# Patient Record
Sex: FEMALE | Race: WHITE | URBAN - METROPOLITAN AREA
[De-identification: names, ages, dates, MRNs, and addresses within clinical notes are randomized per-mention and may not be internally consistent; named-entity substitution may affect disease eponyms.]

---

## 2017-12-18 ENCOUNTER — OFFICE VISIT (OUTPATIENT)
Dept: FAMILY MEDICINE | Facility: CLINIC | Age: 42
End: 2017-12-18
Payer: COMMERCIAL

## 2017-12-18 VITALS
TEMPERATURE: 97.9 F | DIASTOLIC BLOOD PRESSURE: 66 MMHG | SYSTOLIC BLOOD PRESSURE: 118 MMHG | WEIGHT: 143 LBS | HEIGHT: 65 IN | HEART RATE: 72 BPM | BODY MASS INDEX: 23.82 KG/M2

## 2017-12-18 DIAGNOSIS — Z00.00 ROUTINE GENERAL MEDICAL EXAMINATION AT A HEALTH CARE FACILITY: Primary | ICD-10-CM

## 2017-12-18 DIAGNOSIS — Z23 NEED FOR PROPHYLACTIC VACCINATION AND INOCULATION AGAINST INFLUENZA: ICD-10-CM

## 2017-12-18 DIAGNOSIS — Z12.31 VISIT FOR SCREENING MAMMOGRAM: ICD-10-CM

## 2017-12-18 PROCEDURE — 99386 PREV VISIT NEW AGE 40-64: CPT | Mod: 25 | Performed by: NURSE PRACTITIONER

## 2017-12-18 PROCEDURE — 90471 IMMUNIZATION ADMIN: CPT | Performed by: NURSE PRACTITIONER

## 2017-12-18 PROCEDURE — 90686 IIV4 VACC NO PRSV 0.5 ML IM: CPT | Performed by: NURSE PRACTITIONER

## 2017-12-18 ASSESSMENT — ENCOUNTER SYMPTOMS
RHINORRHEA: 0
ACTIVITY CHANGE: 0
LIGHT-HEADEDNESS: 0
DIFFICULTY URINATING: 0
COUGH: 0
DYSPHORIC MOOD: 0
WHEEZING: 0
SLEEP DISTURBANCE: 0
SHORTNESS OF BREATH: 0
CHEST TIGHTNESS: 0
ABDOMINAL PAIN: 0
NAUSEA: 0
FATIGUE: 0
PALPITATIONS: 0
HEADACHES: 0
ARTHRALGIAS: 0
CONSTIPATION: 0
SORE THROAT: 0
FREQUENCY: 0
NERVOUS/ANXIOUS: 0
NUMBNESS: 0
POLYDIPSIA: 0
ABDOMINAL DISTENTION: 0
ROS SKIN COMMENTS: SELF BREAST EXAM WITHOUT AREA OF CONCERN.
DIZZINESS: 0
VOMITING: 0
POLYPHAGIA: 0
DIARRHEA: 0

## 2017-12-18 NOTE — MR AVS SNAPSHOT
After Visit Summary   12/18/2017    Brigitte Bassett    MRN: [id number]           Patient Information     Date Of Birth          1975        Visit Information        Provider Department      12/18/2017 3:00 PM Brii Rosas APRN [facility]        Today's Diagnoses     Routine general medical examination at a health care facility    -  1    Visit for screening mammogram        Vaginal discharge        Need for prophylactic vaccination and inoculation against influenza          Care Instructions    Make a return lab appointment for fasting labs when you have not had anything to eat for 8 hours prior and the only thing to drink is water.       Preventive Health Recommendations  Female Ages 40 to 49    Yearly exam:     See your health care provider every year in order to  1. Review health changes.   2. Discuss preventive care.    3. Review your medicines if your doctor prescribed any.      Get a Pap test every three years (unless you have an abnormal result and your provider advises testing more often).      If you get Pap tests with HPV test, you only need to test every 5 years, unless you have an abnormal result. You do not need a Pap test if your uterus was removed (hysterectomy) and you have not had cancer.      You should be tested each year for STDs (sexually transmitted diseases), if you're at risk.       Ask your doctor if you should have a mammogram.      Have a colonoscopy (test for colon cancer) if someone in your family has had colon cancer or polyps before age 50.       Have a cholesterol test every 5 years.       Have a diabetes test (fasting glucose) after age 45. If you are at risk for diabetes, you should have this test every 3 years.    Shots: Get a flu shot each year. Get a tetanus shot every 10 years.     Nutrition:     Eat at least 5 servings of fruits and vegetables each day.    Eat whole-grain bread, whole-wheat pasta and brown rice instead of white  "grains and rice.    Talk to your provider about Calcium and Vitamin D.     Lifestyle    Exercise at least 150 minutes a week (an average of 30 minutes a day, 5 days a week). This will help you control your weight and prevent disease.    Limit alcohol to one drink per day.    No smoking.     Wear sunscreen to prevent skin cancer.    See your dentist every six months for an exam and cleaning.          Follow-ups after your visit        Future tests that were ordered for you today     Open Future Orders        Priority Expected Expires Ordered    MA Screening Digital Bilateral Routine  12/18/2018 12/18/2017    CBC with platelets differential Routine  12/18/2018 12/18/2017    Comprehensive metabolic panel Routine  12/18/2018 12/18/2017    TSH with free T4 reflex Routine  12/18/2018 12/18/2017    Lipid panel reflex to direct LDL Fasting Routine  12/18/2018 12/18/2017            Who to contact     Normal or non-critical lab and imaging results will be communicated to you by [employer] Media[employer], letter or phone within 4 business days after the clinic has received the results. If you do not hear from us within 7 days, please contact the clinic through [employer]endot or phone. If you have a critical or abnormal lab result, we will notify you by phone as soon as possible.  Submit refill requests through Sellvana or call your pharmacy and they will forward the refill request to us. Please allow 3 business days for your refill to be completed.          If you need to speak with a  for additional information , please call: [phone]           Additional Information About Your Visit        Sellvana Information     Sellvana lets you send messages to your doctor, view your test results, renew your prescriptions, schedule appointments and more. To sign up, go to [email or web address] . Click on \"Log in\" on the left side of the screen, which will take you to the Welcome page. Then click on \"Sign up Now\" on the right side of the " "page.     You will be asked to enter the access code listed below, as well as some personal information. Please follow the directions to create your username and password.     Your access code is: [email or web address]  Expires: 3/18/2018  3:34 PM     Your access code will  in 90 days. If you need help or a new code, please call your [facility] clinic or [phone].        Care EveryWhere ID     This is your Care EveryWhere ID. This could be used by other organizations to access your [facility] medical records  [id number]        Your Vitals Were     Pulse Temperature Height Last Period BMI (Body Mass Index)       72 97.9  F (36.6  C) (Tympanic) 5' 5.25\" (1.657 m) 2017 23.61 kg/m2        Blood Pressure from Last 3 Encounters:   17 118/66    Weight from Last 3 Encounters:   17 143 lb (64.9 kg)              We Performed the Following     FLU VAC, SPLIT VIRUS IM > 3 YO (QUADRIVALENT) [81052]     Vaccine Administration, Initial [75161]        Primary Care Provider Office Phone # Fax #    Brii Mery Rosas, APRN [phone]       [facility] [address] [place] [address]        Equal Access to Services     MORRIS RODAS AH: Hadii aad ku hadasho Soomaali, waaxda luqadaha, qaybta kaalmada adeegyada, waxumesh idiin hayaan maninder [place] . So [facility] [phone].    ATENCIÓN: Si habla español, tiene a charles disposición servicios gratuitos de asistencia lingüística. Llame al [phone].    We comply with applicable federal civil rights laws and [place] laws. We do not discriminate on the basis of race, color, national origin, age, disability, sex, sexual orientation, or gender identity.            Thank you!     Thank you for choosing [facility]  for your care. Our goal is always to provide you with excellent care. Hearing back from our patients is one way we can continue to improve our services. Please take a few minutes to complete the written survey " that you may receive in the mail after your visit with us. Thank you!             Your Updated Medication List - Protect others around you: Learn how to safely use, store and throw away your medicines at www.disposemymeds.org.      Notice  As of 12/18/2017  3:34 PM    You have not been prescribed any medications.

## 2017-12-18 NOTE — PATIENT INSTRUCTIONS
Make a return lab appointment for fasting labs when you have not had anything to eat for 8 hours prior and the only thing to drink is water.       Preventive Health Recommendations  Female Ages 40 to 49    Yearly exam:     See your health care provider every year in order to  1. Review health changes.   2. Discuss preventive care.    3. Review your medicines if your doctor prescribed any.      Get a Pap test every three years (unless you have an abnormal result and your provider advises testing more often).      If you get Pap tests with HPV test, you only need to test every 5 years, unless you have an abnormal result. You do not need a Pap test if your uterus was removed (hysterectomy) and you have not had cancer.      You should be tested each year for STDs (sexually transmitted diseases), if you're at risk.       Ask your doctor if you should have a mammogram.      Have a colonoscopy (test for colon cancer) if someone in your family has had colon cancer or polyps before age 50.       Have a cholesterol test every 5 years.       Have a diabetes test (fasting glucose) after age 45. If you are at risk for diabetes, you should have this test every 3 years.    Shots: Get a flu shot each year. Get a tetanus shot every 10 years.     Nutrition:     Eat at least 5 servings of fruits and vegetables each day.    Eat whole-grain bread, whole-wheat pasta and brown rice instead of white grains and rice.    Talk to your provider about Calcium and Vitamin D.     Lifestyle    Exercise at least 150 minutes a week (an average of 30 minutes a day, 5 days a week). This will help you control your weight and prevent disease.    Limit alcohol to one drink per day.    No smoking.     Wear sunscreen to prevent skin cancer.    See your dentist every six months for an exam and cleaning.
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
## 2017-12-18 NOTE — NURSING NOTE
"Chief Complaint   Patient presents with     Physical       Initial /66 (BP Location: Left arm, Patient Position: Sitting, Cuff Size: Adult Regular)  Pulse 72  Temp 97.9  F (36.6  C) (Tympanic)  Ht 5' 5.25\" (1.657 m)  Wt 143 lb (64.9 kg)  LMP 11/27/2017  BMI 23.61 kg/m2 Estimated body mass index is 23.61 kg/(m^2) as calculated from the following:    Height as of this encounter: 5' 5.25\" (1.657 m).    Weight as of this encounter: 143 lb (64.9 kg).  Medication Reconciliation: complete     April SAMMY Mahmood      "

## 2018-01-03 ENCOUNTER — TELEPHONE (OUTPATIENT)
Dept: FAMILY MEDICINE | Facility: CLINIC | Age: 43
End: 2018-01-03

## 2018-01-03 NOTE — TELEPHONE ENCOUNTER
Please call Brigitte and let her know that we got her old records.  Her last Pap smear was in 2015 and it was normal.  She will need a repeat Pap smear and HPV testing in  August 2018.    Brii PALACIO

## 2018-01-05 ENCOUNTER — HOSPITAL ENCOUNTER (OUTPATIENT)
Dept: MAMMOGRAPHY | Facility: CLINIC | Age: 43
Discharge: HOME | End: 2018-01-05
Attending: NURSE PRACTITIONER | Admitting: NURSE PRACTITIONER
Payer: COMMERCIAL

## 2018-01-05 DIAGNOSIS — Z00.00 ROUTINE GENERAL MEDICAL EXAMINATION AT A HEALTH CARE FACILITY: ICD-10-CM

## 2018-01-05 DIAGNOSIS — Z12.31 VISIT FOR SCREENING MAMMOGRAM: ICD-10-CM

## 2018-01-05 LAB
ALBUMIN SERPL-MCNC: 3.6 G/DL (ref 3.4–5)
ALP SERPL-CCNC: 54 U/L (ref 40–150)
ALT SERPL W P-5'-P-CCNC: 14 U/L (ref 0–50)
ANION GAP SERPL CALCULATED.3IONS-SCNC: 6 MMOL/L (ref 3–14)
AST SERPL W P-5'-P-CCNC: 11 U/L (ref 0–45)
BASOPHILS # BLD AUTO: 0 10E9/L (ref 0–0.2)
BASOPHILS NFR BLD AUTO: 0.3 %
BILIRUB SERPL-MCNC: 0.8 MG/DL (ref 0.2–1.3)
BUN SERPL-MCNC: 9 MG/DL (ref 7–30)
CALCIUM SERPL-MCNC: 8.6 MG/DL (ref 8.5–10.1)
CHLORIDE SERPL-SCNC: 107 MMOL/L (ref 94–109)
CHOLEST SERPL-MCNC: 175 MG/DL
CO2 SERPL-SCNC: 26 MMOL/L (ref 20–32)
CREAT SERPL-MCNC: 0.55 MG/DL (ref 0.52–1.04)
DIFFERENTIAL METHOD BLD: NORMAL
EOSINOPHIL # BLD AUTO: 0 10E9/L (ref 0–0.7)
EOSINOPHIL NFR BLD AUTO: 0.6 %
ERYTHROCYTE [DISTWIDTH] IN BLOOD BY AUTOMATED COUNT: 12.9 % (ref 10–15)
GFR SERPL CREATININE-BSD FRML MDRD: >90 ML/MIN/1.7M2
GLUCOSE SERPL-MCNC: 83 MG/DL (ref 70–99)
HCT VFR BLD AUTO: 37.3 % (ref 35–47)
HDLC SERPL-MCNC: 81 MG/DL
HGB BLD-MCNC: 12.7 G/DL (ref 11.7–15.7)
LDLC SERPL CALC-MCNC: 82 MG/DL
LYMPHOCYTES # BLD AUTO: 2.1 10E9/L (ref 0.8–5.3)
LYMPHOCYTES NFR BLD AUTO: 31.7 %
MCH RBC QN AUTO: 33 PG (ref 26.5–33)
MCHC RBC AUTO-ENTMCNC: 34 G/DL (ref 31.5–36.5)
MCV RBC AUTO: 97 FL (ref 78–100)
MONOCYTES # BLD AUTO: 0.6 10E9/L (ref 0–1.3)
MONOCYTES NFR BLD AUTO: 8.5 %
NEUTROPHILS # BLD AUTO: 3.8 10E9/L (ref 1.6–8.3)
NEUTROPHILS NFR BLD AUTO: 58.9 %
NONHDLC SERPL-MCNC: 94 MG/DL
PLATELET # BLD AUTO: 270 10E9/L (ref 150–450)
POTASSIUM SERPL-SCNC: 3.7 MMOL/L (ref 3.4–5.3)
PROT SERPL-MCNC: 6.7 G/DL (ref 6.8–8.8)
RBC # BLD AUTO: 3.85 10E12/L (ref 3.8–5.2)
SODIUM SERPL-SCNC: 139 MMOL/L (ref 133–144)
TRIGL SERPL-MCNC: 62 MG/DL
TSH SERPL DL<=0.005 MIU/L-ACNC: 0.93 MU/L (ref 0.4–4)
WBC # BLD AUTO: 6.5 10E9/L (ref 4–11)

## 2018-01-05 PROCEDURE — 77063 BREAST TOMOSYNTHESIS BI: CPT

## 2018-01-05 PROCEDURE — 80061 LIPID PANEL: CPT | Performed by: NURSE PRACTITIONER

## 2018-01-05 PROCEDURE — 36415 COLL VENOUS BLD VENIPUNCTURE: CPT | Performed by: NURSE PRACTITIONER

## 2018-01-05 PROCEDURE — 80050 GENERAL HEALTH PANEL: CPT | Performed by: NURSE PRACTITIONER

## 2018-01-18 ENCOUNTER — HOSPITAL ENCOUNTER (OUTPATIENT)
Dept: MAMMOGRAPHY | Facility: CLINIC | Age: 43
Discharge: HOME | End: 2018-01-18
Attending: NURSE PRACTITIONER | Admitting: NURSE PRACTITIONER
Payer: COMMERCIAL

## 2018-01-18 DIAGNOSIS — R92.8 ABNORMAL MAMMOGRAM: ICD-10-CM

## 2018-01-18 PROCEDURE — 77065 DX MAMMO INCL CAD UNI: CPT | Mod: LT

## 2018-01-31 RX ORDER — LIDOCAINE HYDROCHLORIDE 10 MG/ML
15 INJECTION, SOLUTION EPIDURAL; INFILTRATION; INTRACAUDAL; PERINEURAL ONCE
Status: COMPLETED | OUTPATIENT
Start: 2018-02-01 | End: 2018-02-01

## 2018-01-31 NOTE — DISCHARGE INSTRUCTIONS
Breast Biopsy Care Instructions      Site Care:    You may have some discomfort in the breast and may see some bruising at the needle site.    You will be given an ice pack which should be kept in place over the dressing until bedtime tonight.Always keep a gauze pad between your skin and the ice pack.    Wearing your bra continuously for 24 hours post biopsy will give optimal support to the biopsy site.    Activity:       You may go back to your normal routine.     No heavy lifting for 24 hours.    Diet and Medications:       You may go back to your regular diet.     Tylenol is the best choice to relieve your discomfort, the first 24 hours. If you have no bleeding on the first day, Ibuprofen (such as Advil, or Motrin), may be taken on the second day after for pain.     Do not take aspirin for 72 hours following your biopsy.    Questions:     If you have any questions about your procedure performed in Ultrasound, you may contact us at [phone].If you have any questions about your procedure performed in Mammography, you may contact us at [phone].    Results:       The pathology report on your biopsy is usually available within 72 hours. The Radiologist or your personal physician will call you with the results.     You will receive information about any further follow up from your physician.    Call your doctor if you have:       More than slight bleeding or significant pain, or experience swelling of the breast.     If your physician is unavailable, please call the Nurse Advice Line at [phone], or [facility] Emergency Department at [phone].      Patient:______________________________  Time:_________  Date:_____________    Instructor:____________________________   Time:_________  Date:_____________

## 2018-02-01 ENCOUNTER — HOSPITAL ENCOUNTER (OUTPATIENT)
Dept: MAMMOGRAPHY | Facility: CLINIC | Age: 43
End: 2018-02-01
Attending: NURSE PRACTITIONER
Payer: COMMERCIAL

## 2018-02-01 ENCOUNTER — HOSPITAL ENCOUNTER (OUTPATIENT)
Dept: MAMMOGRAPHY | Facility: CLINIC | Age: 43
Discharge: HOME | End: 2018-02-01
Attending: NURSE PRACTITIONER | Admitting: NURSE PRACTITIONER
Payer: COMMERCIAL

## 2018-02-01 DIAGNOSIS — R92.1 BREAST CALCIFICATION, LEFT: ICD-10-CM

## 2018-02-01 DIAGNOSIS — R92.8 ABNORMAL MAMMOGRAM: ICD-10-CM

## 2018-02-01 PROCEDURE — 27210207 MA STEREOTACTIC BREAST BIOPSY VACUUM ASST LEFT

## 2018-02-01 PROCEDURE — 40000986 MA POST PROCEDURE LEFT

## 2018-02-01 PROCEDURE — 25000125 ZZHC RX 250: Performed by: NURSE PRACTITIONER

## 2018-02-01 PROCEDURE — 88305 TISSUE EXAM BY PATHOLOGIST: CPT | Mod: 26 | Performed by: RADIOLOGY

## 2018-02-01 PROCEDURE — 88305 TISSUE EXAM BY PATHOLOGIST: CPT | Performed by: RADIOLOGY

## 2018-02-01 RX ADMIN — LIDOCAINE HYDROCHLORIDE 150 MG: 10 INJECTION, SOLUTION EPIDURAL; INFILTRATION; INTRACAUDAL; PERINEURAL at 13:27

## 2018-02-01 NOTE — IP AVS SNAPSHOT
MRN:[id number]                      After Visit Summary   2/1/2018    Brigitte Bassett    MRN: [id number]           Visit Information        Provider Department      2/1/2018  1:00 PM [facility]; [facility]           Review of your medicines      Notice     Cannot display patient medications because the patient has not yet been checked in.             Protect others around you: Learn how to safely use, store and throw away your medicines at www.disposemymeds.org.         Follow-ups after your visit        Your next 10 appointments already scheduled     Feb 01, 2018  1:00 PM CST   (Arrive by 12:45 PM)   MA STEREOTACTIC BREAST BIOPSY VACUUM ASST LEFT with CLARISSA1, [place] [facility]   [facility] ([facility])    [address] [id number] [phone]           Do not use any powder, lotion or deodorant under your arms or on your breast. If you do, we will ask you to remove it before your exam.  Wear comfortable clothing and a well-fitted bra to the clinic. (A sports bra is best.) Bring a list of your medicines, including vitamins, minerals and over-the-counter drugs. It is best to leave valuables at home.  Tell your care team if:   You have any allergies.   You are taking aspirin, ibuprofen, naproxen or any drug that could increase bleeding.  Bring any previous mammograms from other facilities or have them mailed to the breast center.    Stop taking Coumadin (warfarin) 5 days before treatment. Restart the day after treatment.   If you take Plavix, Ticlid, Pletal or Persantine, ask your doctor if you should stop these medicines. You may need extra tests on the morning of your scan            Feb 01, 2018  2:30 PM CST   MA POST PROCEDURE LEFT with WYMA1   [facility] ([facility])    [address] [id number] [phone]           Do not use any powder, lotion or deodorant under your arms or on your  breast. If you do, we will ask you to remove it before your exam.  Wear comfortable, two-piece clothing.  If you have any allergies, tell your care team.  Bring any previous mammograms from other facilities or have them mailed to the breast center.               Care Instructions        Further instructions from your care team       Breast Biopsy Care Instructions      Site Care:    You may have some discomfort in the breast and may see some bruising at the needle site.    You will be given an ice pack which should be kept in place over the dressing until bedtime tonight.Always keep a gauze pad between your skin and the ice pack.    Wearing your bra continuously for 24 hours post biopsy will give optimal support to the biopsy site.    Activity:       You may go back to your normal routine.     No heavy lifting for 24 hours.    Diet and Medications:       You may go back to your regular diet.     Tylenol is the best choice to relieve your discomfort, the first 24 hours. If you have no bleeding on the first day, Ibuprofen (such as Advil, or Motrin), may be taken on the second day after for pain.     Do not take aspirin for 72 hours following your biopsy.    Questions:     If you have any questions about your procedure performed in Ultrasound, you may contact us at [phone].If you have any questions about your procedure performed in Mammography, you may contact us at [phone].    Results:       The pathology report on your biopsy is usually available within 72 hours. The Radiologist or your personal physician will call you with the results.     You will receive information about any further follow up from your physician.    Call your doctor if you have:       More than slight bleeding or significant pain, or experience swelling of the breast.     If your physician is unavailable, please call the Nurse Advice Line at [phone], or [facility] Emergency Department at  "[phone].      Patient:______________________________  Time:_________  Date:_____________    Instructor:____________________________   Time:_________  Date:_____________      Additional Information About Your Visit        MyChart Information     [email or web address] lets you send messages to your doctor, view your test results, renew your prescriptions, schedule appointments and more. To sign up, go to [email or web address] . Click on \"Log in\" on the left side of the screen, which will take you to the Welcome page. Then click on \"Sign up Now\" on the right side of the page.     You will be asked to enter the access code listed below, as well as some personal information. Please follow the directions to create your username and password.     Your access code is: [email or web address]  Expires: 3/18/2018  3:34 PM     Your access code will  in 90 days. If you need help or a new code, please call your [facility] clinic or [phone].        Care EveryWhere ID     This is your Care EveryWhere ID. This could be used by other organizations to access your [facility] medical records  [id number]         Primary Care Provider Office Phone # Fax #    Brii INGRID Fontaine [phone]      Equal Access to Services     MORRIS RODAS : jelly Kincaid, uriel rodrigez. So [facility] [phone].    ATENCIÓN: Si habla español, tiene a charles disposición servicios gratuitos de asistencia lingüística. Jorge al [phone].    We comply with applicable federal civil rights laws and [place] laws. We do not discriminate on the basis of race, color, national origin, age, disability, sex, sexual orientation, or gender identity.            Thank you!     Thank you for choosing [facility] for your care. Our goal is always to provide you with excellent care. Hearing back from our patients is one way we can continue to improve our services. Please take a " few minutes to complete the written survey that you may receive in the mail after you visit with us. Thank you!             Medication List: This is a list of all your medications and when to take them. Check marks below indicate your daily home schedule. Keep this list as a reference.      Notice     Cannot display patient medications because the patient has not yet been checked in.

## 2018-02-01 NOTE — IP AVS SNAPSHOT
[facility]    [address]    [facility] [address]    Phone:  [phone]    Fax:  [phone]                                       After Visit Summary   2/1/2018    Brigitte Bassett    MRN: [id number]           After Visit Summary Signature Page     I have received my discharge instructions, and my questions have been answered. I have discussed any challenges I see with this plan with the nurse or doctor.    ..........................................................................................................................................  Patient/Patient Representative Signature      ..........................................................................................................................................  Patient Representative Print Name and Relationship to Patient    ..................................................               ................................................  Date                                            Time    ..........................................................................................................................................  Reviewed by Signature/Title    ...................................................              ..............................................  Date                                                            Time

## 2018-02-01 NOTE — PROGRESS NOTES
RADIOLOGY PROCEDURE NOTE  Patient name: Brigitte Bassett  MRN: [id number]  : 1975    Pre-procedure diagnosis: Left breast microcalcifications.  Post-procedure diagnosis: Same    Procedure Date/Time: 2018  1:54 PM  Procedure: Stereotactic needle core biopsy.  Estimated blood loss: None  Specimen(s) collected:  Six vacuum needle cores.  The patient tolerated the procedure well with no immediate complications.    See imaging dictation for procedural details.    Provider name: Siddhartha Medrano  Assistant(s):None

## 2018-02-05 LAB — COPATH REPORT: NORMAL

## 2018-10-31 ENCOUNTER — HEALTH MAINTENANCE LETTER (OUTPATIENT)
Age: 43
End: 2018-10-31

## 2019-01-21 ENCOUNTER — ALLIED HEALTH/NURSE VISIT (OUTPATIENT)
Dept: FAMILY MEDICINE | Facility: CLINIC | Age: 44
End: 2019-01-21
Payer: COMMERCIAL

## 2019-01-21 DIAGNOSIS — Z23 NEED FOR PROPHYLACTIC VACCINATION AND INOCULATION AGAINST INFLUENZA: Primary | ICD-10-CM

## 2019-01-21 PROCEDURE — 90471 IMMUNIZATION ADMIN: CPT

## 2019-01-21 PROCEDURE — 90686 IIV4 VACC NO PRSV 0.5 ML IM: CPT

## 2019-01-21 PROCEDURE — 99207 ZZC NO CHARGE NURSE ONLY: CPT

## 2019-01-21 NOTE — PROGRESS NOTES

## 2019-04-04 ENCOUNTER — OFFICE VISIT (OUTPATIENT)
Dept: FAMILY MEDICINE | Facility: CLINIC | Age: 44
End: 2019-04-04
Payer: COMMERCIAL

## 2019-04-04 ENCOUNTER — ANCILLARY PROCEDURE (OUTPATIENT)
Dept: MAMMOGRAPHY | Facility: CLINIC | Age: 44
End: 2019-04-04
Attending: NURSE PRACTITIONER
Payer: COMMERCIAL

## 2019-04-04 VITALS
SYSTOLIC BLOOD PRESSURE: 112 MMHG | BODY MASS INDEX: 23.46 KG/M2 | OXYGEN SATURATION: 99 % | HEIGHT: 66 IN | RESPIRATION RATE: 14 BRPM | TEMPERATURE: 97.3 F | WEIGHT: 146 LBS | HEART RATE: 90 BPM | DIASTOLIC BLOOD PRESSURE: 62 MMHG

## 2019-04-04 DIAGNOSIS — Z23 NEED FOR TETANUS, DIPHTHERIA, AND ACELLULAR PERTUSSIS (TDAP) VACCINE: ICD-10-CM

## 2019-04-04 DIAGNOSIS — Z12.31 VISIT FOR SCREENING MAMMOGRAM: ICD-10-CM

## 2019-04-04 DIAGNOSIS — Z12.4 SCREENING FOR MALIGNANT NEOPLASM OF CERVIX: ICD-10-CM

## 2019-04-04 DIAGNOSIS — Z00.00 ROUTINE GENERAL MEDICAL EXAMINATION AT A HEALTH CARE FACILITY: Primary | ICD-10-CM

## 2019-04-04 DIAGNOSIS — Z11.51 SPECIAL SCREENING EXAMINATION FOR HUMAN PAPILLOMAVIRUS (HPV): ICD-10-CM

## 2019-04-04 PROCEDURE — G0145 SCR C/V CYTO,THINLAYER,RESCR: HCPCS | Performed by: NURSE PRACTITIONER

## 2019-04-04 PROCEDURE — 77067 SCR MAMMO BI INCL CAD: CPT | Mod: TC

## 2019-04-04 PROCEDURE — 90715 TDAP VACCINE 7 YRS/> IM: CPT | Performed by: NURSE PRACTITIONER

## 2019-04-04 PROCEDURE — 99396 PREV VISIT EST AGE 40-64: CPT | Mod: 25 | Performed by: NURSE PRACTITIONER

## 2019-04-04 PROCEDURE — 90471 IMMUNIZATION ADMIN: CPT | Performed by: NURSE PRACTITIONER

## 2019-04-04 PROCEDURE — 87624 HPV HI-RISK TYP POOLED RSLT: CPT | Performed by: NURSE PRACTITIONER

## 2019-04-04 ASSESSMENT — ENCOUNTER SYMPTOMS
VOMITING: 0
FREQUENCY: 0
ABDOMINAL DISTENTION: 0
PALPITATIONS: 0
ABDOMINAL PAIN: 0
ARTHRALGIAS: 0
HEADACHES: 0
CHEST TIGHTNESS: 0
DYSPHORIC MOOD: 0
COUGH: 0
NUMBNESS: 0
DIFFICULTY URINATING: 0
SORE THROAT: 0
FATIGUE: 0
DIARRHEA: 0
CONSTIPATION: 0
LIGHT-HEADEDNESS: 0
SHORTNESS OF BREATH: 0
POLYDIPSIA: 0
ACTIVITY CHANGE: 0
RHINORRHEA: 0
DIZZINESS: 0
POLYPHAGIA: 0
NERVOUS/ANXIOUS: 0
NAUSEA: 0
ROS SKIN COMMENTS: SELF BREAST EXAM WITHOUT AREA OF CONCERN.
WHEEZING: 0
SLEEP DISTURBANCE: 0

## 2019-04-04 ASSESSMENT — MIFFLIN-ST. JEOR: SCORE: 1316.06

## 2019-04-04 ASSESSMENT — PAIN SCALES - GENERAL: PAINLEVEL: NO PAIN (0)

## 2019-04-04 NOTE — PROGRESS NOTES
SUBJECTIVE:   CC: Brigitte Bassett is an 44 year old woman who presents for preventive health visit.     Healthy Habits:    Do you get at least three servings of calcium containing foods daily (dairy, green leafy vegetables, etc.)? yes    Amount of exercise or daily activities, outside of work: walking     Problems taking medications regularly No    Medication side effects: No    Have you had an eye exam in the past two years? yes    Do you see a dentist twice per year? yes    Do you have sleep apnea, excessive snoring or daytime drowsiness?no      Today's PHQ-2 Score:   PHQ-2 (  [employer]) 2017   Q1: Little interest or pleasure in doing things 0 0   Q2: Feeling down, depressed or hopeless 0 0   PHQ-2 Score 0 0       Abuse: Current or Past(Physical, Sexual or Emotional)- No  Do you feel safe in your environment? Yes    Social History     Tobacco Use     Smoking status: Former Smoker     Packs/day: 0.50     Types: Cigarettes     Last attempt to quit: 2007     Years since quittin.3     Smokeless tobacco: Never Used     Tobacco comment: smoked for 10-15 years    Substance Use Topics     Alcohol use: Yes     Comment: occ      If you drink alcohol do you typically have >3 drinks per day or >7 drinks per week? No                     Reviewed orders with patient.  Reviewed health maintenance and updated orders accordingly - Yes  BP Readings from Last 3 Encounters:   19 112/62   17 118/66    Wt Readings from Last 3 Encounters:   19 66.2 kg (146 lb)   17 64.9 kg (143 lb)              Mammogram Screening: Patient under age 50, mutual decision reflected in health maintenance.      Pertinent mammograms are reviewed under the imaging tab.  History of abnormal Pap smear: Last 3 Pap and HPV Results:       Reviewed and updated as needed this visit by clinical staff  Tobacco  Allergies  Meds  Med Hx  Surg Hx  Fam Hx  Soc Hx        Reviewed and updated as needed this visit by  "Provider          Here today for physical. No current concerns.  Is at tail end of period.  Has been looking for a different car.  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    Last Pap: 2015 normal, never an abnormal.   Last mammogram: has had and were ok, normal. Mother, grandmother and maternal aunts (3) all positive. No sisters   Last BMD: never  Last Colonoscopy: Never, no family history of colon caner  Last eye exam: within the last 6 mo  Last dental exam: Every 6 months   Last tetanus vaccine: Unsure.   Last influenza vaccine: 1/19  Last shingles vaccine: Never   Last pneumonia vaccine: Never  Hep C screen (born [birth year]): N/A  HIV screen: Was with pregnancies-negative   AAA screen (age 65-78 with smoking hx): N/A  IVD (HTN, Hyperlipid, Smoking): N/A  Lung CA screening (55-80, 30 pk smoking hx): N/A    ROS:  Review of Systems   Constitutional: Negative for activity change and fatigue.   HENT: Negative for ear pain, rhinorrhea and sore throat.    Respiratory: Negative for cough, chest tightness, shortness of breath and wheezing.    Cardiovascular: Negative for chest pain, palpitations and leg swelling.   Gastrointestinal: Negative for abdominal distention, abdominal pain, constipation, diarrhea, nausea and vomiting.   Endocrine: Negative for cold intolerance, heat intolerance, polydipsia, polyphagia and polyuria.   Genitourinary: Negative for difficulty urinating, enuresis, frequency and urgency.   Musculoskeletal: Negative for arthralgias.   Skin: Negative for rash.        Self breast exam without area of concern.    Neurological: Negative for dizziness, light-headedness, numbness and headaches.   Psychiatric/Behavioral: Negative for dysphoric mood and sleep disturbance. The patient is not nervous/anxious.          OBJECTIVE:   /62   Pulse 90   Temp 97.3  F (36.3  C) (Tympanic)   Resp 14   Ht 1.664 m (5' 5.5\")   Wt 66.2 kg (146 lb)   LMP 04/01/2019   SpO2 99%   " BMI 23.93 kg/m    EXAM:  Physical Exam   Constitutional: She appears well-developed and well-nourished.   HENT:   Head: Normocephalic and atraumatic.   Right Ear: Tympanic membrane and external ear normal. No middle ear effusion.   Left Ear: Tympanic membrane and external ear normal.  No middle ear effusion.   Nose: No mucosal edema.   Mouth/Throat: Uvula is midline, oropharynx is clear and moist and mucous membranes are normal.   Eyes: Pupils are equal, round, and reactive to light.   Neck: Normal range of motion. Carotid bruit is not present. No thyromegaly present.   Cardiovascular: Normal rate, regular rhythm and normal heart sounds.   Pulses:       Femoral pulses are 2+ on the right side, and 2+ on the left side.       Dorsalis pedis pulses are 2+ on the right side, and 2+ on the left side.        Posterior tibial pulses are 2+ on the right side, and 2+ on the left side.   Pulmonary/Chest: Effort normal and breath sounds normal.   Abdominal: Soft. Normal appearance and bowel sounds are normal. There is no tenderness.   Genitourinary: Vagina normal. There is no lesion on the right labia. There is no lesion on the left labia. Uterus is not enlarged. Cervix exhibits no motion tenderness and no discharge. Right adnexum displays no mass and no tenderness. Left adnexum displays no mass and no tenderness. No erythema in the vagina. No vaginal discharge found.   Musculoskeletal: Normal range of motion.   Neurological: She is alert.   Skin: Skin is warm and dry. No rash noted.   Psychiatric: She has a normal mood and affect. Her behavior is normal.       ASSESSMENT/PLAN:   1. Routine general medical examination at a health care facility  Screening guidelines reviewed.   Plan to follow up in 1 year or sooner if needed     2. Screening for malignant neoplasm of cervix  - Pap imaged thin layer screen with HPV - recommended age 30 - 65 years (select HPV order below)    3. Special screening examination for human  "papillomavirus (HPV)  - HPV High Risk Types DNA Cervical    4. Need for tetanus, diphtheria, and acellular pertussis (Tdap) vaccine  Administer today in clinic   - TDAP VACCINE (ADACEL)    COUNSELING:   Reviewed preventive health counseling, as reflected in patient instructions       Regular exercise       Healthy diet/nutrition       Immunizations    Vaccinated for: TDAP          BP Readings from Last 1 Encounters:   04/04/19 112/62     Estimated body mass index is 23.93 kg/m  as calculated from the following:    Height as of this encounter: 1.664 m (5' 5.5\").    Weight as of this encounter: 66.2 kg (146 lb).           reports that she quit smoking about 11 years ago. Her smoking use included cigarettes. She smoked 0.50 packs per day. she has never used smokeless tobacco.      Counseling Resources:  ATP IV Guidelines  Pooled Cohorts Equation Calculator  Breast Cancer Risk Calculator  FRAX Risk Assessment  ICSI Preventive Guidelines  Dietary Guidelines for Americans, 2010  USDA's MyPlate  ASA Prophylaxis  Lung CA Screening    INGRID Goyal [facility]    "

## 2019-04-04 NOTE — NURSING NOTE
Screening Questionnaire for Adult Immunization    Are you sick today?   No   Do you have allergies to medications, food, a vaccine component or latex?   Yes   Have you ever had a serious reaction after receiving a vaccination?   No   Do you have a long-term health problem with heart disease, lung disease, asthma, kidney disease, metabolic disease (e.g. diabetes), anemia, or other blood disorder?   No   Do you have cancer, leukemia, HIV/AIDS, or any other immune system problem?   No   In the past 3 months, have you taken medications that affect  your immune system, such as prednisone, other steroids, or anticancer drugs; drugs for the treatment of rheumatoid arthritis, Crohn s disease, or psoriasis; or have you had radiation treatments?   No   Have you had a seizure, or a brain or other nervous system problem?   No   During the past year, have you received a transfusion of blood or blood     products, or been given immune (gamma) globulin or antiviral drug?   No   For women: Are you pregnant or is there a chance you could become        pregnant during the next month?   No   Have you received any vaccinations in the past 4 weeks?   No     Immunization questionnaire was positive for at least one answer.  Notified Brii Rosas.        Per orders of. Brii Rosas, injection of Tdap given by Yamilex Rene. Patient instructed to remain in clinic for 15 minutes afterwards, and to report any adverse reaction to me immediately.       Screening performed by Yamilex Rene on 4/4/2019 at 1:59 PM.

## 2019-04-04 NOTE — LETTER
April 11, 2019    Brigitte Lantiguaivette  [address] [address]    Dear ,  This letter is regarding your recent Pap smear (cervical cancer screening) and Human Papillomavirus (HPV) test.  We are happy to inform you that your Pap smear result is normal. Cervical cancer is closely linked with certain types of HPV. Your results showed no evidence of high-risk HPV.  Therefore we recommend you return in 5 years for your next pap smear and HPV test.  The presence of endometrial cells was seen on your current pap smear.  This is most likely due to your menstrual cycle.  No follow up is recommended unless you have irregular periods or spotting/bleeding in between your cycles.  If this occurs, please contact the clinic for a follow up appointment.   You will still need to return to the clinic every year for an annual exam and other preventive tests.  If you have additional questions regarding this result, please call our registered nurse, Alexsandra at [phone].  Sincerely,  INGRID Goyal CNP/es

## 2019-04-08 LAB
COPATH REPORT: NORMAL
PAP: NORMAL

## 2019-04-09 LAB
FINAL DIAGNOSIS: NORMAL
HPV HR 12 DNA CVX QL NAA+PROBE: NEGATIVE
HPV16 DNA SPEC QL NAA+PROBE: NEGATIVE
HPV18 DNA SPEC QL NAA+PROBE: NEGATIVE
SPECIMEN DESCRIPTION: NORMAL
SPECIMEN SOURCE CVX/VAG CYTO: NORMAL

## (undated) RX ORDER — LIDOCAINE HYDROCHLORIDE 10 MG/ML
INJECTION, SOLUTION EPIDURAL; INFILTRATION; INTRACAUDAL; PERINEURAL
Status: DISPENSED
Start: 2018-02-01